# Patient Record
Sex: MALE | Race: WHITE | NOT HISPANIC OR LATINO | Employment: OTHER | URBAN - METROPOLITAN AREA
[De-identification: names, ages, dates, MRNs, and addresses within clinical notes are randomized per-mention and may not be internally consistent; named-entity substitution may affect disease eponyms.]

---

## 2017-01-01 ENCOUNTER — PATIENT OUTREACH (OUTPATIENT)
Dept: HEALTH INFORMATION MANAGEMENT | Facility: OTHER | Age: 77
End: 2017-01-01

## 2017-05-12 NOTE — PROGRESS NOTES
Outcome: Left Message    [facility] Checked & Epic Updated:  no / [facility] pt     [facility] Verified: no    Attempt # 1

## 2017-05-19 NOTE — PROGRESS NOTES
Attempt #:4    [facility] Checked & Epic Updated: no  [facility] Verified: no  Verify PCP: yes    Communication Preference Obtained: no     Review Care Team: no    Annual Wellness Visit Scheduling  1. Scheduling Status:Not Scheduled. Patient states they are under Doctor's care          Care Gap Scheduling (Attempt to Schedule EACH Overdue Care Gap!)     Health Maintenance Due   Topic Date Due   • Annual Wellness Visit  1940   • IMM DTaP/Tdap/Td Vaccine (1 - Tdap) 05/27/1960   • IMM ZOSTER VACCINE  05/27/2001   • IMM PNEUMOCOCCAL 65+ (ADULT) LOW/MEDIUM RISK SERIES (2 of 2 - PCV13) 12/13/2012         [employer] Activation: declined  [employer] Miguel: no  Virtual Visits: no  Opt In to Text Messages: no

## 2017-06-29 PROBLEM — R56.9 SEIZURE (HCC): Status: ACTIVE | Noted: 2017-01-01

## 2017-06-29 PROBLEM — J96.90 RESPIRATORY FAILURE (HCC): Status: ACTIVE | Noted: 2017-01-01
